# Patient Record
Sex: FEMALE | URBAN - METROPOLITAN AREA
[De-identification: names, ages, dates, MRNs, and addresses within clinical notes are randomized per-mention and may not be internally consistent; named-entity substitution may affect disease eponyms.]

---

## 2023-09-13 LAB
ABO, EXTERNAL RESULT: NORMAL
HEP B, EXTERNAL RESULT: NEGATIVE
HIV, EXTERNAL RESULT: NON REACTIVE
RPR, EXTERNAL RESULT: NON REACTIVE
RUBELLA TITER, EXTERNAL RESULT: NORMAL

## 2023-10-12 LAB
C. TRACHOMATIS, EXTERNAL RESULT: NEGATIVE
N. GONORRHOEAE, EXTERNAL RESULT: NEGATIVE

## 2024-04-03 LAB — GBS, EXTERNAL RESULT: NEGATIVE

## 2024-04-29 ENCOUNTER — HOSPITAL ENCOUNTER (INPATIENT)
Facility: HOSPITAL | Age: 24
LOS: 3 days | Discharge: HOME | End: 2024-05-02
Attending: OBSTETRICS & GYNECOLOGY | Admitting: OBSTETRICS & GYNECOLOGY

## 2024-04-29 PROBLEM — Z34.90 ENCOUNTER FOR ELECTIVE INDUCTION OF LABOR: Status: ACTIVE | Noted: 2024-04-29

## 2024-04-29 LAB
ABO + RH BLD: NORMAL
BLOOD GROUP ANTIBODIES SERPL: NORMAL
ERYTHROCYTE [DISTWIDTH] IN BLOOD BY AUTOMATED COUNT: 15.2 % (ref 11.5–14.5)
HCT VFR BLD AUTO: 36.1 % (ref 35–47)
HGB BLD-MCNC: 11.5 G/DL (ref 11.5–16)
MCH RBC QN AUTO: 26 PG (ref 26–34)
MCHC RBC AUTO-ENTMCNC: 31.9 G/DL (ref 30–36.5)
MCV RBC AUTO: 81.5 FL (ref 80–99)
NRBC # BLD: 0 K/UL (ref 0–0.01)
NRBC BLD-RTO: 0 PER 100 WBC
PLATELET # BLD AUTO: 208 K/UL (ref 150–400)
PMV BLD AUTO: 10.9 FL (ref 8.9–12.9)
RBC # BLD AUTO: 4.43 M/UL (ref 3.8–5.2)
SPECIMEN EXP DATE BLD: NORMAL
WBC # BLD AUTO: 8.1 K/UL (ref 3.6–11)

## 2024-04-29 PROCEDURE — 86850 RBC ANTIBODY SCREEN: CPT

## 2024-04-29 PROCEDURE — C1726 CATH, BAL DIL, NON-VASCULAR: HCPCS

## 2024-04-29 PROCEDURE — 59200 INSERT CERVICAL DILATOR: CPT

## 2024-04-29 PROCEDURE — 2580000003 HC RX 258: Performed by: OBSTETRICS & GYNECOLOGY

## 2024-04-29 PROCEDURE — 86592 SYPHILIS TEST NON-TREP QUAL: CPT

## 2024-04-29 PROCEDURE — 86900 BLOOD TYPING SEROLOGIC ABO: CPT

## 2024-04-29 PROCEDURE — 36415 COLL VENOUS BLD VENIPUNCTURE: CPT

## 2024-04-29 PROCEDURE — 6370000000 HC RX 637 (ALT 250 FOR IP): Performed by: OBSTETRICS & GYNECOLOGY

## 2024-04-29 PROCEDURE — 86901 BLOOD TYPING SEROLOGIC RH(D): CPT

## 2024-04-29 PROCEDURE — 1100000000 HC RM PRIVATE

## 2024-04-29 PROCEDURE — 7210000100 HC LABOR FEE PER 1 HR

## 2024-04-29 PROCEDURE — 85027 COMPLETE CBC AUTOMATED: CPT

## 2024-04-29 RX ORDER — SODIUM CHLORIDE, SODIUM LACTATE, POTASSIUM CHLORIDE, AND CALCIUM CHLORIDE .6; .31; .03; .02 G/100ML; G/100ML; G/100ML; G/100ML
500 INJECTION, SOLUTION INTRAVENOUS PRN
Status: DISCONTINUED | OUTPATIENT
Start: 2024-04-29 | End: 2024-04-30

## 2024-04-29 RX ORDER — ONDANSETRON 4 MG/1
4 TABLET, ORALLY DISINTEGRATING ORAL EVERY 6 HOURS PRN
Status: DISCONTINUED | OUTPATIENT
Start: 2024-04-29 | End: 2024-04-30

## 2024-04-29 RX ORDER — ACETAMINOPHEN 325 MG/1
650 TABLET ORAL EVERY 4 HOURS PRN
Status: DISCONTINUED | OUTPATIENT
Start: 2024-04-29 | End: 2024-04-30

## 2024-04-29 RX ORDER — METHYLERGONOVINE MALEATE 0.2 MG/ML
200 INJECTION INTRAVENOUS PRN
Status: DISCONTINUED | OUTPATIENT
Start: 2024-04-29 | End: 2024-04-30

## 2024-04-29 RX ORDER — DOCUSATE SODIUM 100 MG/1
100 CAPSULE, LIQUID FILLED ORAL 2 TIMES DAILY
COMMUNITY

## 2024-04-29 RX ORDER — HYDROMORPHONE HYDROCHLORIDE 1 MG/ML
1 INJECTION, SOLUTION INTRAMUSCULAR; INTRAVENOUS; SUBCUTANEOUS
Status: DISCONTINUED | OUTPATIENT
Start: 2024-04-29 | End: 2024-04-30

## 2024-04-29 RX ORDER — ONDANSETRON 2 MG/ML
4 INJECTION INTRAMUSCULAR; INTRAVENOUS EVERY 6 HOURS PRN
Status: DISCONTINUED | OUTPATIENT
Start: 2024-04-29 | End: 2024-04-30

## 2024-04-29 RX ORDER — SODIUM CHLORIDE 9 MG/ML
25 INJECTION, SOLUTION INTRAVENOUS PRN
Status: DISCONTINUED | OUTPATIENT
Start: 2024-04-29 | End: 2024-04-30

## 2024-04-29 RX ORDER — MISOPROSTOL 200 UG/1
400 TABLET ORAL PRN
Status: DISCONTINUED | OUTPATIENT
Start: 2024-04-29 | End: 2024-04-30

## 2024-04-29 RX ORDER — CITALOPRAM 20 MG/1
40 TABLET ORAL DAILY
Status: DISCONTINUED | OUTPATIENT
Start: 2024-04-29 | End: 2024-04-30

## 2024-04-29 RX ORDER — SODIUM CHLORIDE 0.9 % (FLUSH) 0.9 %
5-40 SYRINGE (ML) INJECTION EVERY 12 HOURS SCHEDULED
Status: DISCONTINUED | OUTPATIENT
Start: 2024-04-29 | End: 2024-04-30

## 2024-04-29 RX ORDER — CITALOPRAM 40 MG/1
40 TABLET ORAL DAILY
COMMUNITY

## 2024-04-29 RX ORDER — SODIUM CHLORIDE 0.9 % (FLUSH) 0.9 %
5-40 SYRINGE (ML) INJECTION PRN
Status: DISCONTINUED | OUTPATIENT
Start: 2024-04-29 | End: 2024-04-30

## 2024-04-29 RX ORDER — CARBOPROST TROMETHAMINE 250 UG/ML
250 INJECTION, SOLUTION INTRAMUSCULAR PRN
Status: DISCONTINUED | OUTPATIENT
Start: 2024-04-29 | End: 2024-04-30

## 2024-04-29 RX ORDER — SODIUM CHLORIDE, SODIUM LACTATE, POTASSIUM CHLORIDE, CALCIUM CHLORIDE 600; 310; 30; 20 MG/100ML; MG/100ML; MG/100ML; MG/100ML
INJECTION, SOLUTION INTRAVENOUS CONTINUOUS
Status: DISCONTINUED | OUTPATIENT
Start: 2024-04-29 | End: 2024-04-30

## 2024-04-29 RX ORDER — ZOLPIDEM TARTRATE 5 MG/1
5 TABLET ORAL NIGHTLY PRN
Status: DISCONTINUED | OUTPATIENT
Start: 2024-04-29 | End: 2024-04-30

## 2024-04-29 RX ORDER — SODIUM CHLORIDE, SODIUM LACTATE, POTASSIUM CHLORIDE, AND CALCIUM CHLORIDE .6; .31; .03; .02 G/100ML; G/100ML; G/100ML; G/100ML
1000 INJECTION, SOLUTION INTRAVENOUS PRN
Status: DISCONTINUED | OUTPATIENT
Start: 2024-04-29 | End: 2024-04-30

## 2024-04-29 RX ORDER — TERBUTALINE SULFATE 1 MG/ML
0.25 INJECTION, SOLUTION SUBCUTANEOUS
Status: DISCONTINUED | OUTPATIENT
Start: 2024-04-29 | End: 2024-04-30

## 2024-04-29 RX ADMIN — ZOLPIDEM TARTRATE 5 MG: 5 TABLET ORAL at 22:42

## 2024-04-29 RX ADMIN — CITALOPRAM HYDROBROMIDE 40 MG: 20 TABLET ORAL at 22:42

## 2024-04-29 RX ADMIN — Medication 25 MCG: at 19:33

## 2024-04-29 RX ADMIN — SODIUM CHLORIDE, POTASSIUM CHLORIDE, SODIUM LACTATE AND CALCIUM CHLORIDE 500 ML: 600; 310; 30; 20 INJECTION, SOLUTION INTRAVENOUS at 22:57

## 2024-04-29 NOTE — PROGRESS NOTES
4/29/2024  5:22 PM Patient arrived to LDR 3 for scheduled induction of labor for elective/ LGA. Patient report positive fetal movement and denies any bleeding or loss of fluid.    1815: Dr Ellis at bedside to see patient and discuss plan of care. VSCAN performed, vertex position confirmed.    2120: Patient called out with vaginal pressure. FB noted right at vaginal opening. Removed. Moderate amount of bleeding noted on pad underneath patient. Dr Ellis updated- per MD don't given any more Cytotec unless contractions space way out.    2330 Bedside and Verbal shift change report given to Guera (oncoming nurse) by Barrington (offgoing nurse). Report included the following information Nurse Handoff Report, Index, Intake/Output, MAR, Recent Results, and Med Rec Status.

## 2024-04-29 NOTE — PROGRESS NOTES
Labor Progress Note    Introduced myself to Ms Patton and her spouse at the bedside  G1 @ 39 weeks 5 days undergoing IOL for suspected LGA  EFW 84 th percentile  AC > 98 %     Patient seen, fetal heart rate and contraction pattern evaluated.     Bedside U/S confirms Cephalic presentation      VSS AF  Physical Exam:  Cervical Exam: Sterile speculum inserted. Cook introduced without difficulty and inflated with 60 cc/60 cc saline  Membranes:  Intact  Uterine Activity: Frequency: Irregular  Fetal Heart Rate: Reactive  Accelerations: yes  Decelerations:none  Uterine contractions: irregular    Assessment/Plan:    Cat 1 tracing  Cook in place  GBS negative  Plan for 3 doses of cytotec overnight and pit in AM  Risks of LGA discussed along with shoulder dystocia . Pt. Verbalizes understanding and all questions answered      Jeronimo Ellis MD

## 2024-04-29 NOTE — H&P
History & Physical    Name: Debbie Patton MRN: [id number]  SSN: (Not on file)    YOB: 2000  Age: 24 y.o.  Sex: female      Subjective: Induction     Estimated Date of Delivery: 24  OB History          2    Para        Term                AB   1    Living             SAB   1    IAB        Ectopic        Molar        Multiple        Live Births                    Ms. Patton is admitted with pregnancy at 39w5d for induction of labor due to LGA baby and discomforts of pregnancy. . Prenatal course was complicated by  efw 84% (8-1  with AC 98% on 24) and vaginismus with pelvic exams .  Please see prenatal records for details.    Past Medical History:   Diagnosis Date    Anemia     Asthma     Inhaler PRN    Mental disorder     Anxiety and Depression ,on celexa    Trauma     Parent divorce, sibling death     Past Surgical History:   Procedure Laterality Date    TONSILLECTOMY       Social History     Occupational History    Not on file   Tobacco Use    Smoking status: Never    Smokeless tobacco: Never   Substance and Sexual Activity    Alcohol use: Not Currently    Drug use: Never    Sexual activity: Not on file     History reviewed. No pertinent family history.    Allergies   Allergen Reactions    Amoxicillin Hives    Penicillins Hives     Prior to Admission medications    Medication Sig Start Date End Date Taking? Authorizing Provider   citalopram (CELEXA) 40 MG tablet Take 1 tablet by mouth daily   Yes Albert Gabriel MD   Ferrous Sulfate (SLOW FE PO) Take by mouth   Yes Albert Gabriel MD   docusate sodium (COLACE) 100 MG capsule Take 1 capsule by mouth 2 times daily   Yes ProviderAlbert MD        Review of Systems: Pertinent items are noted in the History of Present Illness.    Objective:     Vitals:  Vitals:    24 1726   BP: 124/83   Pulse: (!) 114   Temp: 98.5 °F (36.9 °C)   TempSrc: Oral        Physical Exam: based on office exam 24

## 2024-04-30 ENCOUNTER — ANESTHESIA EVENT (OUTPATIENT)
Facility: HOSPITAL | Age: 24
End: 2024-04-30

## 2024-04-30 ENCOUNTER — ANESTHESIA (OUTPATIENT)
Facility: HOSPITAL | Age: 24
End: 2024-04-30

## 2024-04-30 LAB — RPR SER QL: NONREACTIVE

## 2024-04-30 PROCEDURE — 3700000156 HC EPIDURAL ANESTHESIA

## 2024-04-30 PROCEDURE — 3700000025 EPIDURAL BLOCK: Performed by: ANESTHESIOLOGY

## 2024-04-30 PROCEDURE — 6370000000 HC RX 637 (ALT 250 FOR IP): Performed by: OBSTETRICS & GYNECOLOGY

## 2024-04-30 PROCEDURE — 1120000000 HC RM PRIVATE OB

## 2024-04-30 PROCEDURE — 00HU33Z INSERTION OF INFUSION DEVICE INTO SPINAL CANAL, PERCUTANEOUS APPROACH: ICD-10-PCS | Performed by: ANESTHESIOLOGY

## 2024-04-30 PROCEDURE — 7210000100 HC LABOR FEE PER 1 HR

## 2024-04-30 PROCEDURE — 2500000003 HC RX 250 WO HCPCS: Performed by: ANESTHESIOLOGY

## 2024-04-30 PROCEDURE — 7100000000 HC PACU RECOVERY - FIRST 15 MIN

## 2024-04-30 PROCEDURE — 0KQM0ZZ REPAIR PERINEUM MUSCLE, OPEN APPROACH: ICD-10-PCS | Performed by: OBSTETRICS & GYNECOLOGY

## 2024-04-30 PROCEDURE — 6360000002 HC RX W HCPCS: Performed by: OBSTETRICS & GYNECOLOGY

## 2024-04-30 PROCEDURE — 2580000003 HC RX 258: Performed by: OBSTETRICS & GYNECOLOGY

## 2024-04-30 PROCEDURE — 6360000002 HC RX W HCPCS: Performed by: ANESTHESIOLOGY

## 2024-04-30 PROCEDURE — 7100000001 HC PACU RECOVERY - ADDTL 15 MIN

## 2024-04-30 PROCEDURE — 7220000101 HC DELIVERY VAGINAL/SINGLE

## 2024-04-30 RX ORDER — SODIUM CHLORIDE, SODIUM LACTATE, POTASSIUM CHLORIDE, CALCIUM CHLORIDE 600; 310; 30; 20 MG/100ML; MG/100ML; MG/100ML; MG/100ML
INJECTION, SOLUTION INTRAVENOUS CONTINUOUS
Status: DISCONTINUED | OUTPATIENT
Start: 2024-04-30 | End: 2024-05-02 | Stop reason: HOSPADM

## 2024-04-30 RX ORDER — OXYCODONE HYDROCHLORIDE 5 MG/1
5 TABLET ORAL EVERY 4 HOURS PRN
Status: DISCONTINUED | OUTPATIENT
Start: 2024-04-30 | End: 2024-05-02 | Stop reason: HOSPADM

## 2024-04-30 RX ORDER — LIDOCAINE HCL/EPINEPHRINE/PF 2%-1:200K
VIAL (ML) INJECTION PRN
Status: DISCONTINUED | OUTPATIENT
Start: 2024-04-30 | End: 2024-04-30 | Stop reason: SDUPTHER

## 2024-04-30 RX ORDER — ACETAMINOPHEN 500 MG
1000 TABLET ORAL EVERY 8 HOURS SCHEDULED
Status: DISCONTINUED | OUTPATIENT
Start: 2024-04-30 | End: 2024-05-02 | Stop reason: HOSPADM

## 2024-04-30 RX ORDER — MISOPROSTOL 200 UG/1
800 TABLET ORAL PRN
Status: DISCONTINUED | OUTPATIENT
Start: 2024-04-30 | End: 2024-05-02 | Stop reason: HOSPADM

## 2024-04-30 RX ORDER — EPHEDRINE SULFATE 50 MG/ML
INJECTION INTRAVENOUS
Status: DISCONTINUED
Start: 2024-04-30 | End: 2024-04-30 | Stop reason: WASHOUT

## 2024-04-30 RX ORDER — DOCUSATE SODIUM 100 MG/1
100 CAPSULE, LIQUID FILLED ORAL 2 TIMES DAILY PRN
Status: DISCONTINUED | OUTPATIENT
Start: 2024-04-30 | End: 2024-05-02 | Stop reason: HOSPADM

## 2024-04-30 RX ORDER — NALOXONE HYDROCHLORIDE 0.4 MG/ML
INJECTION, SOLUTION INTRAMUSCULAR; INTRAVENOUS; SUBCUTANEOUS PRN
Status: DISCONTINUED | OUTPATIENT
Start: 2024-04-30 | End: 2024-04-30

## 2024-04-30 RX ORDER — SODIUM CHLORIDE 0.9 % (FLUSH) 0.9 %
5-40 SYRINGE (ML) INJECTION EVERY 12 HOURS SCHEDULED
Status: DISCONTINUED | OUTPATIENT
Start: 2024-04-30 | End: 2024-05-02 | Stop reason: HOSPADM

## 2024-04-30 RX ORDER — IBUPROFEN 400 MG/1
800 TABLET ORAL EVERY 8 HOURS SCHEDULED
Status: DISCONTINUED | OUTPATIENT
Start: 2024-04-30 | End: 2024-05-02 | Stop reason: HOSPADM

## 2024-04-30 RX ORDER — BUPIVACAINE HYDROCHLORIDE 2.5 MG/ML
INJECTION, SOLUTION EPIDURAL; INFILTRATION; INTRACAUDAL PRN
Status: DISCONTINUED | OUTPATIENT
Start: 2024-04-30 | End: 2024-04-30 | Stop reason: SDUPTHER

## 2024-04-30 RX ORDER — FENTANYL/BUPIVACAINE/NS/PF 2-1250MCG
1-15 PLASTIC BAG, INJECTION (ML) INJECTION CONTINUOUS
Status: DISCONTINUED | OUTPATIENT
Start: 2024-04-30 | End: 2024-04-30

## 2024-04-30 RX ORDER — MISOPROSTOL 200 UG/1
800 TABLET ORAL ONCE
Status: COMPLETED | OUTPATIENT
Start: 2024-04-30 | End: 2024-04-30

## 2024-04-30 RX ORDER — EPHEDRINE SULFATE 50 MG/ML
10 INJECTION INTRAVENOUS
Status: DISCONTINUED | OUTPATIENT
Start: 2024-04-30 | End: 2024-04-30

## 2024-04-30 RX ORDER — ONDANSETRON 2 MG/ML
4 INJECTION INTRAMUSCULAR; INTRAVENOUS EVERY 6 HOURS PRN
Status: DISCONTINUED | OUTPATIENT
Start: 2024-04-30 | End: 2024-04-30

## 2024-04-30 RX ORDER — DIPHENHYDRAMINE HCL 25 MG
25 CAPSULE ORAL EVERY 4 HOURS PRN
Status: DISCONTINUED | OUTPATIENT
Start: 2024-04-30 | End: 2024-04-30

## 2024-04-30 RX ORDER — ONDANSETRON 2 MG/ML
4 INJECTION INTRAMUSCULAR; INTRAVENOUS EVERY 6 HOURS PRN
Status: DISCONTINUED | OUTPATIENT
Start: 2024-04-30 | End: 2024-05-02 | Stop reason: HOSPADM

## 2024-04-30 RX ORDER — SODIUM CHLORIDE 9 MG/ML
INJECTION, SOLUTION INTRAVENOUS PRN
Status: DISCONTINUED | OUTPATIENT
Start: 2024-04-30 | End: 2024-05-02 | Stop reason: HOSPADM

## 2024-04-30 RX ORDER — MODIFIED LANOLIN
OINTMENT (GRAM) TOPICAL PRN
Status: DISCONTINUED | OUTPATIENT
Start: 2024-04-30 | End: 2024-05-02 | Stop reason: HOSPADM

## 2024-04-30 RX ORDER — SODIUM CHLORIDE 0.9 % (FLUSH) 0.9 %
5-40 SYRINGE (ML) INJECTION PRN
Status: DISCONTINUED | OUTPATIENT
Start: 2024-04-30 | End: 2024-05-02 | Stop reason: HOSPADM

## 2024-04-30 RX ADMIN — Medication 166.7 ML: at 09:50

## 2024-04-30 RX ADMIN — Medication 10 ML/HR: at 06:33

## 2024-04-30 RX ADMIN — LIDOCAINE HYDROCHLORIDE AND EPINEPHRINE 5 ML: 20; 5 INJECTION, SOLUTION EPIDURAL; INFILTRATION; INTRACAUDAL; PERINEURAL at 06:17

## 2024-04-30 RX ADMIN — BUPIVACAINE HYDROCHLORIDE 4 ML: 2.5 INJECTION, SOLUTION EPIDURAL; INFILTRATION; INTRACAUDAL; PERINEURAL at 06:19

## 2024-04-30 RX ADMIN — ACETAMINOPHEN 1000 MG: 500 TABLET ORAL at 16:04

## 2024-04-30 RX ADMIN — MISOPROSTOL 800 MCG: 200 TABLET ORAL at 09:57

## 2024-04-30 RX ADMIN — SODIUM CHLORIDE, POTASSIUM CHLORIDE, SODIUM LACTATE AND CALCIUM CHLORIDE: 600; 310; 30; 20 INJECTION, SOLUTION INTRAVENOUS at 06:32

## 2024-04-30 RX ADMIN — DOCUSATE SODIUM 100 MG: 100 CAPSULE, LIQUID FILLED ORAL at 20:52

## 2024-04-30 RX ADMIN — HYDROMORPHONE HYDROCHLORIDE 1 MG: 1 INJECTION, SOLUTION INTRAMUSCULAR; INTRAVENOUS; SUBCUTANEOUS at 04:17

## 2024-04-30 RX ADMIN — IBUPROFEN 800 MG: 400 TABLET, FILM COATED ORAL at 20:52

## 2024-04-30 RX ADMIN — OXYTOCIN 1 MILLI-UNITS/MIN: 10 INJECTION INTRAVENOUS at 05:01

## 2024-04-30 RX ADMIN — SODIUM CHLORIDE, POTASSIUM CHLORIDE, SODIUM LACTATE AND CALCIUM CHLORIDE: 600; 310; 30; 20 INJECTION, SOLUTION INTRAVENOUS at 04:56

## 2024-04-30 RX ADMIN — IBUPROFEN 800 MG: 400 TABLET, FILM COATED ORAL at 12:14

## 2024-04-30 ASSESSMENT — PAIN SCALES - GENERAL: PAINLEVEL_OUTOF10: 4

## 2024-04-30 ASSESSMENT — PAIN - FUNCTIONAL ASSESSMENT: PAIN_FUNCTIONAL_ASSESSMENT: ACTIVITIES ARE NOT PREVENTED

## 2024-04-30 ASSESSMENT — PAIN DESCRIPTION - LOCATION: LOCATION: ABDOMEN

## 2024-04-30 ASSESSMENT — PAIN DESCRIPTION - DESCRIPTORS: DESCRIPTORS: CRAMPING

## 2024-04-30 NOTE — PROCEDURES
Patient: Debbie Patton  MRN: [id number]  : 2000    Delivery Note    Pre-Delivery Diagnosis: IUP at 39 weeks, 6 days, LGA    Delivery:      Infant Details:  Information for the patient's :  Yang Patton [[id number]]        Placenta: Spontaneous/Intact with 3-vessel cord    Episiotomy: None    Laceration(s): 2nd degree perineal and hemostatic periurethral    Episiotomy/Laceration(s) Repair: Yes, repaired with 2-0 monocryl.    EBL: 300cc    Anesthesia: epidural    Complications: None    Group Beta Strep: No components found for: \"OBEXTGRBS\"     Called to bedside. Pt pushed x 2 contractions with TSVD of LBMI. Nuchal x 2 reduced. Anterior should delivered followed by remainder of body without difficulty. Baby a little stunned and placed on mom's chest. Recovered quickly with stimulation. Apgars 8,9.     Signed: Cristóbal Ervin DO     2024

## 2024-04-30 NOTE — LACTATION NOTE
This note was copied from a baby's chart.  . Colostrum easily expressed from both breasts. Baby had a blood sugar of 30. Glucose gel given by RN. Assisted mother latching baby to the left breast in the football hold for a 10 minute feed. Assisted mother latching baby to the right breast in the cross cradle position. Audible swallows noted and rhythmic suck observed. Plan of care is to first nurse baby at the breast and then top off baby with hand expressed colostrum. Blood sugar will be taken again in one hour. Mother was complaining of nipple pain. Gagan Vega's ordered. Educated mother of deep latching techniques.     Feeding Plan:  Mother will keep baby skin to skin as often as possible, feed on demand, 8-12x/day , respond to feeding cues, obtain latch, listen for audible swallowing, be aware of signs of oxytocin release/ cramping,thirst,sleepiness while breastfeeding, offer both breasts,and will not limit feedings.  Mother agrees to utilize breast massage while nursing to facilitate lactogenesis.

## 2024-04-30 NOTE — ANESTHESIA PROCEDURE NOTES
Epidural Block    Patient location during procedure: OB  Start time: 4/30/2024 6:11 AM  End time: 4/30/2024 6:21 AM  Reason for block: labor epidural  Staffing  Anesthesiologist: Pernell Freitas DO  Performed by: Pernell Frietas DO  Authorized by: Pernell Freitas DO    Epidural  Patient position: sitting  Prep: DuraPrep  Patient monitoring: continuous pulse ox and frequent blood pressure checks  Location: L4-5  Injection technique: DEL air  Provider prep: mask and sterile gloves  Needle  Needle type: Tuohy   Needle gauge: 17 G  Needle length: 3.5 in  Needle insertion depth: 6 cm  Catheter type: end hole  Catheter size: 18 G  Catheter at skin depth: 11 cm  Test dose: negativeCatheter Secured: tegaderm and tape  Assessment  Sensory level: T10  Hemodynamics: stable  Attempts: 1  Outcomes: uncomplicated and patient tolerated procedure well  Preanesthetic Checklist  Completed: patient identified, IV checked, site marked, risks and benefits discussed, surgical/procedural consents, equipment checked, pre-op evaluation, timeout performed, anesthesia consent given, oxygen available, monitors applied/VS acknowledged, fire risk safety assessment completed and verbalized and blood product R/B/A discussed and consented

## 2024-04-30 NOTE — ANESTHESIA POSTPROCEDURE EVALUATION
Department of Anesthesiology  Postprocedure Note    Patient: Debbie Patton  MRN: [id number]  YOB: 2000  Date of evaluation: 4/30/2024    Procedure Summary       Date: 04/30/24 Room / Location:     Anesthesia Start: 0611 Anesthesia Stop: 0944    Procedure: Labor Analgesia Diagnosis:     Scheduled Providers:  Responsible Provider: Jorge Luis Thapa MD    Anesthesia Type: epidural ASA Status: 2            Anesthesia Type: No value filed.    Jacinto Phase I:      Jacinto Phase II:      Anesthesia Post Evaluation    Patient location during evaluation: PACU  Patient participation: complete - patient participated  Level of consciousness: awake  Pain score: 0  Airway patency: patent  Nausea & Vomiting: no nausea  Cardiovascular status: blood pressure returned to baseline and hemodynamically stable  Respiratory status: acceptable  Hydration status: stable  Pain management: adequate and satisfactory to patient    No notable events documented.

## 2024-04-30 NOTE — PROGRESS NOTES
Bedside shift change report given to Lucas RN (oncoming nurse) by Justin CENTENO (offgoing nurse). Report included the following information Nurse Handoff Report, Intake/Output, MAR, and Recent Results.     0906 pt straight cathed for 150cc . Cervix checked and pt is 10/100/+2

## 2024-04-30 NOTE — DISCHARGE INSTRUCTIONS
daily lives, which may result in increased anxiety, depression and fear. If you are feeling unsettled or worse, please know that we are here to help. During this time of increased caution and care for one another, [facility] [place] ([facility]) is offering virtual and in person support groups to ALL MOTHERS in [place] regardless of the age of your child/children as a way to help weather this emotional storm together. Social support is an important part of self-care during this time of physical distancing.  Virtual postpartum support group meetings available at www.postpartumva.org  Warm Line: 127.769.4669    Breastfeeding Support Groups   1st and 3rd Wednesday of each month at [facility]  2nd and 4th Tuesday of each month at [facility] (in education center behind cafeteria)    [email or web address]

## 2024-04-30 NOTE — PROGRESS NOTES
Labor Progress Note  Patient seen, fetal heart rate and contraction pattern evaluated, patient examined. Pt comfortable with epidural.     Patient Vitals for the past 2 hrs:   BP Temp Temp src Pulse Resp SpO2   04/30/24 0648 98/61 97.9 °F (36.6 °C) Oral 83 16 100 %   04/30/24 0639 (!) 100/58 -- -- 93 16 100 %   04/30/24 0635 (!) 100/59 -- -- 86 16 100 %   04/30/24 0633 (!) 98/56 -- -- 86 16 100 %   04/30/24 0631 (!) 99/54 -- -- 86 16 100 %   04/30/24 0629 (!) 96/52 -- -- 83 16 100 %   04/30/24 0627 (!) 97/55 -- -- 88 16 100 %   04/30/24 0625 (!) 93/55 -- -- 92 (P) 16 100 %   04/30/24 0623 (!) 101/55 -- -- 85 (P) 16 (P) 98 %   04/30/24 0559 123/62 97.9 °F (36.6 °C) Oral 89 16 100 %       Physical Exam:  Cervical Exam:  5 cm dilated    90% effaced    0 station    Presenting Part: cephalic  Uterine Activity: Frequency: Every 2-3 minutes on 2 pit  Fetal Heart Rate: Baseline: 130s per minute  Variability: moderate  Accelerations: yes  Decelerations: none    Assessment/Plan:  Reassuring fetal status, Labor  Progressing normally, Continue plan for vaginal delivery. Continue pitocin to maintain adequate labor.     Cristóbal Ervin DO

## 2024-04-30 NOTE — PROGRESS NOTES
2330 Received OB SBAR Report from MAYE Hylton RN, patient stated baby is really active and this is normal at this time of the night     0400 Patient requesting IV pain meds, placed on Novii monitor, SVE 4-5/60/-1    0520 Patient SROM for moderate amount of clear blood tinged fluid    0525 Patient up to bathroom    0530 Switched to external FHR monitors     0608 Dr. Freitas at bedside for epidural placement     0655 Dr. Ervin at bedside, SVE 5/90/0

## 2024-04-30 NOTE — ANESTHESIA PRE PROCEDURE
Department of Anesthesiology  Preprocedure Note       Name:  Debbie Patton   Age:  24 y.o.  :  2000                                          MRN:  [id number]         Date:  2024      Surgeon: * No surgeons listed *    Procedure: * No procedures listed *    Medications prior to admission:   Prior to Admission medications    Medication Sig Start Date End Date Taking? Authorizing Provider   citalopram (CELEXA) 40 MG tablet Take 1 tablet by mouth daily   Yes ProviderAlbert MD   Ferrous Sulfate (SLOW FE PO) Take by mouth   Yes ProviderAlbert MD   docusate sodium (COLACE) 100 MG capsule Take 1 capsule by mouth 2 times daily   Yes ProviderAlbert MD       Current medications:    Current Facility-Administered Medications   Medication Dose Route Frequency Provider Last Rate Last Admin    oxytocin (PITOCIN) 30 units in 500 mL infusion  1-20 bud-units/min IntraVENous Continuous Jeronimo Ellis MD 2 mL/hr at 24 0540 2 bud-units/min at 24 0540    fentaNYL 2 mcg/mL BUPivacaine 0.125% in sodium chloride 0.9% 100 mL epidural infusion  1-15 mL/hr Epidural Continuous Pernell Freitas DO        naloxone 0.4 mg in 10 mL sodium chloride syringe   IntraVENous PRN Pernell Freitas DO        ondansetron (ZOFRAN) injection 4 mg  4 mg IntraVENous Q6H PRN Pernell Freitas DO        diphenhydrAMINE (BENADRYL) capsule 25 mg  25 mg Oral Q4H PRN Pernell Freitas DO        ePHEDrine injection 10 mg  10 mg IntraVENous Once PRN Pernell Freitas DO        terbutaline (BRETHINE) injection 0.25 mg  0.25 mg SubCUTAneous Once PRN Jeronimo Ellis MD        lactated ringers IV soln infusion   IntraVENous Continuous Jeronimo Ellis  mL/hr at 24 0540 Rate Verify at 24 0540    lactated ringers bolus 500 mL  500 mL IntraVENous PRN Jeronimo Ellis MD   Stopped at 24 2330    Or    lactated ringers bolus 1,000 mL  1,000 mL IntraVENous PRN Jeronimo Ellis MD        sodium chloride flush 0.9 %

## 2024-05-01 PROCEDURE — 6370000000 HC RX 637 (ALT 250 FOR IP): Performed by: OBSTETRICS & GYNECOLOGY

## 2024-05-01 PROCEDURE — 1120000000 HC RM PRIVATE OB

## 2024-05-01 RX ADMIN — IBUPROFEN 800 MG: 400 TABLET, FILM COATED ORAL at 05:28

## 2024-05-01 RX ADMIN — DOCUSATE SODIUM 100 MG: 100 CAPSULE, LIQUID FILLED ORAL at 21:14

## 2024-05-01 RX ADMIN — ACETAMINOPHEN 1000 MG: 500 TABLET ORAL at 01:58

## 2024-05-01 RX ADMIN — IBUPROFEN 800 MG: 400 TABLET, FILM COATED ORAL at 13:56

## 2024-05-01 RX ADMIN — ACETAMINOPHEN 1000 MG: 500 TABLET ORAL at 09:06

## 2024-05-01 RX ADMIN — IBUPROFEN 800 MG: 400 TABLET, FILM COATED ORAL at 21:14

## 2024-05-01 RX ADMIN — DOCUSATE SODIUM 100 MG: 100 CAPSULE, LIQUID FILLED ORAL at 09:06

## 2024-05-01 RX ADMIN — ACETAMINOPHEN 1000 MG: 500 TABLET ORAL at 17:05

## 2024-05-01 ASSESSMENT — PAIN DESCRIPTION - LOCATION
LOCATION: ABDOMEN
LOCATION: VAGINA;ABDOMEN
LOCATION: ABDOMEN

## 2024-05-01 ASSESSMENT — PAIN SCALES - GENERAL
PAINLEVEL_OUTOF10: 3
PAINLEVEL_OUTOF10: 4
PAINLEVEL_OUTOF10: 5
PAINLEVEL_OUTOF10: 4

## 2024-05-01 ASSESSMENT — PAIN DESCRIPTION - ORIENTATION
ORIENTATION: MID
ORIENTATION: MID
ORIENTATION: LOWER

## 2024-05-01 ASSESSMENT — PAIN - FUNCTIONAL ASSESSMENT
PAIN_FUNCTIONAL_ASSESSMENT: ACTIVITIES ARE NOT PREVENTED
PAIN_FUNCTIONAL_ASSESSMENT: ACTIVITIES ARE NOT PREVENTED

## 2024-05-01 ASSESSMENT — PAIN DESCRIPTION - DESCRIPTORS
DESCRIPTORS: ACHING;CRAMPING
DESCRIPTORS: CRAMPING

## 2024-05-01 NOTE — PROGRESS NOTES
Post-Partum Day Number 1 Progress Note    Patient doing well post-partum without significant complaints.  Voiding without difficulty, normal lochia.  Crampy.  Also mentions both of her legs feel a bit sore    Vitals:  Patient Vitals for the past 24 hrs:   BP Temp Temp src Pulse Resp SpO2   24 0527 118/77 98.7 °F (37.1 °C) Oral 86 16 98 %   24 2043 111/72 99 °F (37.2 °C) Oral 94 16 97 %   24 1604 99/65 99.5 °F (37.5 °C) Oral 84 16 98 %   24 1245 111/73 99.1 °F (37.3 °C) Oral 75 17 97 %   24 1213 112/71 -- -- 90 -- --   24 1149 102/68 -- -- 80 -- --   24 1134 96/66 -- -- 100 -- --   24 1119 110/71 -- -- 90 -- --   24 1104 109/75 -- -- 96 -- --   24 1049 108/78 -- -- 100 -- --   24 1034 (!) 110/58 -- -- 100 -- --   24 1019 (!) 120/58 -- -- (!) 104 -- --   24 1004 118/74 98 °F (36.7 °C) Oral 96 18 100 %     Temp (24hrs), Av.9 °F (37.2 °C), Min:98 °F (36.7 °C), Max:99.5 °F (37.5 °C)      Vital signs stable, afebrile.    Exam:  Patient without distress.               Abdomen soft, fundus firm, nontender               Lower extremities- nontender without edema; no cords    Labs: No results found for this or any previous visit (from the past 24 hour(s)).    Assessment and Plan:  Patient appears to be having uncomplicated post-partum course.  Continue routine perineal care and maternal education.  Plan discharge tomorrow if no problems occur.  B+/RI  Boy- wants circ however BG have been very low overnight and nursery asked that circ not be done today.

## 2024-05-01 NOTE — LACTATION NOTE
This note was copied from a baby's chart.  Baby has low blood sugars and has had to get glucose gel twice. Baby has been started on formula. Mom has been pumping and giving baby the colostrum she collects. I helped mom with bottle feeding this morning.  I encouraged mom to burp the baby frequently. When baby's blood sugars stabilize mom will begin putting baby back to the breast.

## 2024-05-02 VITALS
TEMPERATURE: 97.4 F | DIASTOLIC BLOOD PRESSURE: 65 MMHG | SYSTOLIC BLOOD PRESSURE: 102 MMHG | OXYGEN SATURATION: 98 % | HEART RATE: 78 BPM | RESPIRATION RATE: 16 BRPM

## 2024-05-02 PROCEDURE — 6370000000 HC RX 637 (ALT 250 FOR IP): Performed by: OBSTETRICS & GYNECOLOGY

## 2024-05-02 RX ADMIN — ACETAMINOPHEN 1000 MG: 500 TABLET ORAL at 05:31

## 2024-05-02 RX ADMIN — DOCUSATE SODIUM 100 MG: 100 CAPSULE, LIQUID FILLED ORAL at 08:55

## 2024-05-02 RX ADMIN — IBUPROFEN 800 MG: 400 TABLET, FILM COATED ORAL at 08:55

## 2024-05-02 RX ADMIN — ACETAMINOPHEN 1000 MG: 500 TABLET ORAL at 14:43

## 2024-05-02 NOTE — LACTATION NOTE
This note was copied from a baby's chart.  Mother states that baby is taking his formula supplementation well. Mother is pumping breast milk and also supplementing with that. Plan of care is for mother to put baby to breast for 10 to 15 minutes, then supplement with formula and her pumped breast milk.

## 2024-05-02 NOTE — DISCHARGE SUMMARY
Patient ID:  Debbie Patton  [id number]  24 y.o.  2000    Admit Date: 2024    Discharge Date: 2024     Admitting Physician: Jeronimo Ellis MD  Attending Physician: Cristóbal Ervin DO    Admission Diagnoses: Encounter for induction duet to LGA.    Discharge Diagnoses: Same as above with vaginal delivery producing a viable male infant.  Information for the patient's :  Yang Patton [[id number]]         Additional Diagnoses: none    Maternal Labs: B+/ Rubella imune/ GBS neg    Cord Blood Results:   Information for the patient's :  Yang Patton [[id number]]   No results found for: \"ABORH\", \"PCTDIG\", \"BILI\"         History of Present Illness:   OB History          2    Para   1    Term   1            AB   1    Living   1         SAB   1    IAB        Ectopic        Molar        Multiple   0    Live Births   1              Admitted for IOL 2/2 LGA (AC 98%)    Hospital Course:   Patient was admitted as above and delivered via vaginal delivery.  Please the chart for details.  The postpartum course was unremarkable.  She was deemed stable for discharge home on day 2.  Follow-up:  She was instructed to follow-up with her obstetrician in 2 & 6 weeks    Results of Postpartum Depression Screening:  EPDS  pending       Signed:  Rachel Greenberg DO  2024  8:20 AM

## 2024-05-02 NOTE — LACTATION NOTE
This note was copied from a baby's chart.  Baby is nursing well and improved throughout hospital stay.  Deep latch maintained, mother is comfortable, baby feeding vigorously with rhythmic suck, swallow, breathe pattern, audible swallowing, and evident milk transfer, both breasts offered, baby is asleep following feeding. Baby is receiving supplement of 24 calorie formula due to persistent blood sugar instability and at this feed it was reduced to 20 calorie formula trial with a blood sugar check to be performed before the next feeding.  If  baby's blood sugar is normal per protocol, he will be discharged with this continued supplement plan until follow up with pediatrician, weight loss, voids, and stools are appropriate over past 24 hours. Mother has no further questions for lactation consultant before discharge.

## 2024-05-02 NOTE — PROGRESS NOTES
Post-Partum Day Number 2 Progress Note    Patient doing well post-partum without significant complaints.  Voiding without difficulty, normal lochia.  Feels that her muscles are overall sore but doing ok with po medications    Vitals:  Patient Vitals for the past 24 hrs:   BP Temp Temp src Pulse Resp SpO2   24 0527 108/69 97.6 °F (36.4 °C) Oral 62 16 99 %   24 2111 103/62 98.3 °F (36.8 °C) Oral 82 16 98 %   24 1700 115/73 97.6 °F (36.4 °C) Oral 72 16 98 %   24 0830 104/70 98.4 °F (36.9 °C) Oral 82 16 97 %       Temp (24hrs), Av °F (36.7 °C), Min:97.6 °F (36.4 °C), Max:98.4 °F (36.9 °C)      Vital signs stable, afebrile.    Exam:  Patient without distress.               Abdomen soft, fundus firm, nontender               Lower extremities- nontender without edema; no cords    Labs: No results found for this or any previous visit (from the past 24 hour(s)).    Assessment and Plan:  Patient appears to be having uncomplicated post-partum course.  Continue routine perineal care and maternal education.  Boy, circ complete.  B+/RI. Discharge today